# Patient Record
Sex: FEMALE | Race: WHITE
[De-identification: names, ages, dates, MRNs, and addresses within clinical notes are randomized per-mention and may not be internally consistent; named-entity substitution may affect disease eponyms.]

---

## 2017-01-13 ENCOUNTER — HOSPITAL ENCOUNTER (OUTPATIENT)
Dept: HOSPITAL 47 - RADMAMWWP | Age: 67
Discharge: HOME | End: 2017-01-13
Payer: MEDICARE

## 2017-01-13 DIAGNOSIS — Z80.3: ICD-10-CM

## 2017-01-13 DIAGNOSIS — Z12.31: Primary | ICD-10-CM

## 2017-01-13 PROCEDURE — 77063 BREAST TOMOSYNTHESIS BI: CPT

## 2017-01-18 NOTE — MM
Reason for exam: screening  (asymptomatic).

Last mammogram was performed 1 year and 7 months ago.



History:

Patient is postmenopausal.

Family history of breast cancer in mother at age 79.



Physical Findings:

A clinical breast exam by your physician is recommended on an annual basis and 

results should be correlated with mammographic findings.



MG 3D Screening Mammo W/Cad

Bilateral CC and MLO view(s) were taken.

Prior study comparison: June 17, 2015, bilateral MG screening mammo w CAD.  

January 20, 2014, bilateral digital screening mammo w/CAD.

The breast tissue is heterogeneously dense. This may lower the sensitivity of 

mammography.  No significant changes when compared with prior studies.





ASSESSMENT: Benign, BI-RAD 2



RECOMMENDATION:

Routine screening mammogram of both breasts in 1 year.

## 2017-05-12 ENCOUNTER — HOSPITAL ENCOUNTER (OUTPATIENT)
Dept: HOSPITAL 47 - RADXRMAIN | Age: 67
Discharge: HOME | End: 2017-05-12
Payer: MEDICARE

## 2017-05-12 DIAGNOSIS — I10: ICD-10-CM

## 2017-05-12 DIAGNOSIS — E83.52: Primary | ICD-10-CM

## 2017-05-12 PROCEDURE — 71020: CPT

## 2017-05-12 NOTE — XR
EXAMINATION TYPE: XR chest 2V

 

DATE OF EXAM: 5/12/2017 1:57 PM

 

COMPARISON: NONE

 

HISTORY: Shortness of breath

 

TECHNIQUE:  Frontal and lateral views of the chest are obtained.

 

FINDINGS:

 

Scattered senescent parenchymal changes noted. Hyperinflation compatible with COPD. 

 

No evidence for infiltrate. No evidence for atelectasis.

 

Heart size is stable.

 

Mediastinal structures are stable and grossly unremarkable.

 

No evidence for hilar prominence.

 

Degenerative changes dorsal spine. 

 

IMPRESSION:

1. No evidence for acute pulmonary disease.

## 2023-10-10 ENCOUNTER — HOSPITAL ENCOUNTER (OUTPATIENT)
Dept: HOSPITAL 47 - RADMAMWWP | Age: 73
Discharge: HOME | End: 2023-10-10
Attending: FAMILY MEDICINE
Payer: MEDICARE

## 2023-10-10 DIAGNOSIS — Z12.31: Primary | ICD-10-CM

## 2023-10-10 DIAGNOSIS — Z78.0: ICD-10-CM

## 2023-10-10 DIAGNOSIS — Z80.3: ICD-10-CM

## 2023-10-10 PROCEDURE — 77063 BREAST TOMOSYNTHESIS BI: CPT

## 2023-10-10 PROCEDURE — 77067 SCR MAMMO BI INCL CAD: CPT

## 2023-10-11 NOTE — MM
Reason for Exam: Screening  (asymptomatic). 

Last mammogram was performed 1 year(s) and 5 month(s) ago. 





Patient History: 

Menarche at age 15. First Full-Term Pregnancy at age 20. Postmenopausal.

Mother had breast cancer, age 79. 





Risk Values: 

Kyra 5 year model risk: 3.0%.

NCI Lifetime model risk: 7.7%.





Prior Study Comparison: 

1/20/2014 Bilateral Screening Mammogram, [facility]. 6/17/2015 Bilateral Screening Mammogram, [facility].

1/13/2017 Bilateral Screening Mammogram, [facility]. 





Tissue Density: 

The breast tissue is heterogeneously dense. This may lower the sensitivity of mammography.





Findings: 

Analyzed By CAD. 

There is no suspicious group of microcalcifications or new suspicious mass in either breast. 





Overall Assessment: Benign, BI-RAD 2





Management: 

Screening Mammogram of both breasts in 1 year.

.



Patient should continue monthly self-breast exams.  A clinical breast exam by your physician is

recommended on an annual basis.

This exam should not preclude additional follow-up of suspicious palpable abnormalities.



Note on Kyra scores and lifetime risk:

1. A Kyra score greater than 3% is considered moderate risk. If this is the case, consider

specialist referral to assess eligibility for a risk reducing agent.

2. If overall lifetime risk for the development of breast cancer is 20% or higher, the patient may

qualify for future screening with alternating mammogram and breast MRI.



Electronically signed and approved by: Leopold M. Fregoli, M.D. Radiologis

## 2023-10-24 ENCOUNTER — HOSPITAL ENCOUNTER (OUTPATIENT)
Dept: HOSPITAL 47 - ORWHC2ENDO | Age: 73
Discharge: HOME | End: 2023-10-24
Attending: SURGERY
Payer: MEDICARE

## 2023-10-24 VITALS — TEMPERATURE: 97 F

## 2023-10-24 VITALS — RESPIRATION RATE: 16 BRPM

## 2023-10-24 VITALS — SYSTOLIC BLOOD PRESSURE: 127 MMHG | HEART RATE: 68 BPM | DIASTOLIC BLOOD PRESSURE: 67 MMHG

## 2023-10-24 DIAGNOSIS — Z79.84: ICD-10-CM

## 2023-10-24 DIAGNOSIS — Z87.891: ICD-10-CM

## 2023-10-24 DIAGNOSIS — I10: ICD-10-CM

## 2023-10-24 DIAGNOSIS — Z90.49: ICD-10-CM

## 2023-10-24 DIAGNOSIS — Z79.899: ICD-10-CM

## 2023-10-24 DIAGNOSIS — E78.5: ICD-10-CM

## 2023-10-24 DIAGNOSIS — Z12.11: Primary | ICD-10-CM

## 2023-10-24 DIAGNOSIS — Z86.73: ICD-10-CM

## 2023-10-24 DIAGNOSIS — K57.30: ICD-10-CM

## 2023-10-24 DIAGNOSIS — D12.2: ICD-10-CM

## 2023-10-24 DIAGNOSIS — M19.90: ICD-10-CM

## 2023-10-24 DIAGNOSIS — Z79.890: ICD-10-CM

## 2023-10-24 DIAGNOSIS — E07.9: ICD-10-CM

## 2023-10-24 DIAGNOSIS — E11.9: ICD-10-CM

## 2023-10-24 DIAGNOSIS — Z79.4: ICD-10-CM

## 2023-10-24 LAB — GLUCOSE BLD-MCNC: 173 MG/DL (ref 70–110)

## 2023-10-24 PROCEDURE — 88305 TISSUE EXAM BY PATHOLOGIST: CPT

## 2023-10-24 PROCEDURE — 45380 COLONOSCOPY AND BIOPSY: CPT

## 2023-10-24 NOTE — P.PCN
Date of Procedure: 10/24/23


Procedure(s) Performed: 


PREOPERATIVE DIAGNOSIS: Colon cancer screening


POSTOPERATIVE DIAGNOSIS: Small ascending colon polyp, mild diverticulosis


PROCEDURE: Colonoscopy with biopsy


ANESTHESIA: MAC


SURGEON: Horace Amos M.D.


SPECIMENS: Polyp


ENDOSCOPIC PROCEDURE:  The patient was placed on the endoscopy table in the left

decubitus position.  The Olympus colonoscope was inserted into the anus and 

passed under direct visualization to the base of the cecum.  The appendiceal 

orifice was visualized.  From that point the scope was slowly withdrawn 

inspecting all surfaces carefully.  There were no neoplastic inflammatory or 

polypoid lesions throughout the cecum.  In the ascending colon a small polyp was

seen and removed using the cold biopsy forceps.  The remainder of the ascending 

transverse descending sigmoid and rectum appeared normal.  Mild diverticulosis 

was seen.  Digital rectal examination was normal.  The patient was taken to the 

recovery room in stable condition per anesthesia guidelines.


RECOMMENDATIONS: Resume diet.  Await biopsy results.

## 2023-10-24 NOTE — P.GSHP
History of Present Illness


H&P Date: 10/24/23


Chief Complaint: Colon cancer screening





73-year-old female here for colonoscopy.  She no bowel complaints noted history 

of colon cancer in the family.  Last colonoscopy about 10 years ago was normal.





Past Medical History


Past Medical History: CVA/TIA, Diabetes Mellitus, Hyperlipidemia, Hypertension, 

Osteoarthritis (OA), Thyroid Disorder


Additional Past Medical History / Comment(s): TIA 30 yrs. ago after taking birth

control-no residual effects


History of Any Multi-Drug Resistant Organisms: None Reported


Past Surgical History: Cholecystectomy, Joint Replacement, Tonsillectomy


Additional Past Surgical History / Comment(s): left knee replaced, maribel 

blepharoplasty, cataracts removed


Past Anesthesia/Blood Transfusion Reactions: No Reported Reaction


Smoking Status: Former smoker





Medications and Allergies


                                Home Medications











 Medication  Instructions  Recorded  Confirmed  Type


 


Atorvastatin [Lipitor] 80 mg PO HS 10/20/23 10/24/23 History


 


Desvenlafaxine Succinate [Pristiq] 150 mg PO DAILY 10/20/23 10/24/23 History


 


Ergocalciferol [Vitamin D2 (1250 50,000 unit PO Q30D 10/20/23 10/24/23 History





Mcg = 09716 Iu)]    


 


Exenatide Microspheres [Bydureon 2 mg SQ FR 10/20/23 10/24/23 History





Bcise Auto-Injector]    


 


Insulin Glargine,Hum.rec.anlog 40 units SQ QAM 10/20/23 10/24/23 History





[Lantus Solostar Pen]    


 


Irbesartan/Hydrochlorothiazide 1 each PO DAILY 10/20/23 10/24/23 History





[Irbesartan-Hctz 150-12.5 mg Tb]    


 


Levothyroxine Sodium [Synthroid] 25 mcg PO DAILY 10/20/23 10/24/23 History


 


metFORMIN HCL [Glucophage] 1,000 mg PO BID 10/20/23 10/24/23 History








                                    Allergies











Allergy/AdvReac Type Severity Reaction Status Date / Time


 


No Known Allergies Allergy   Verified 10/24/23 08:41














Surgical - Exam


                                   Vital Signs











Temp Pulse Resp BP Pulse Ox


 


 97.0 F L  61   18   146/66   96 


 


 10/24/23 08:40  10/24/23 08:40  10/24/23 08:40  10/24/23 08:40  10/24/23 08:40

















Physical exam:


General: Well-developed, well-nourished


HEENT: Normocephalic, sclerae nonicteric


Abdomen: Nontender, nondistended


Extremities: No edema


Neuro: Alert and oriented





Results





- Labs


                  Abnormal Lab Results - Last 24 Hours (Table)











  10/24/23 Range/Units





  08:47 


 


POC Glucose (mg/dL)  173 H  ()  mg/dL














Assessment and Plan


(1) Colon cancer screening


Narrative/Plan: 





Will proceed with colonoscopy at this time.


Current Visit: Yes   Status: Acute   Code(s): Z12.11 - ENCOUNTER FOR SCREENING 

FOR MALIGNANT NEOPLASM OF COLON   SNOMED Code(s): 993055335

## 2024-11-20 ENCOUNTER — HOSPITAL ENCOUNTER (OUTPATIENT)
Dept: HOSPITAL 47 - RADMAMWWP | Age: 74
Discharge: HOME | End: 2024-11-20
Attending: FAMILY MEDICINE
Payer: MEDICARE

## 2024-11-20 DIAGNOSIS — Z12.31: Primary | ICD-10-CM

## 2024-11-20 DIAGNOSIS — Z78.0: ICD-10-CM

## 2024-11-20 DIAGNOSIS — Z80.3: ICD-10-CM

## 2024-11-20 DIAGNOSIS — R92.333: ICD-10-CM

## 2024-11-20 PROCEDURE — 77063 BREAST TOMOSYNTHESIS BI: CPT

## 2024-11-20 PROCEDURE — 77067 SCR MAMMO BI INCL CAD: CPT
